# Patient Record
Sex: MALE | Race: WHITE | Employment: FULL TIME | URBAN - METROPOLITAN AREA
[De-identification: names, ages, dates, MRNs, and addresses within clinical notes are randomized per-mention and may not be internally consistent; named-entity substitution may affect disease eponyms.]

---

## 2024-04-04 ENCOUNTER — OFFICE VISIT (OUTPATIENT)
Dept: FAMILY MEDICINE CLINIC | Facility: CLINIC | Age: 29
End: 2024-04-04

## 2024-04-04 ENCOUNTER — HOSPITAL ENCOUNTER (OUTPATIENT)
Dept: ULTRASOUND IMAGING | Facility: HOSPITAL | Age: 29
End: 2024-04-04
Payer: COMMERCIAL

## 2024-04-04 VITALS
SYSTOLIC BLOOD PRESSURE: 138 MMHG | TEMPERATURE: 98 F | WEIGHT: 152 LBS | DIASTOLIC BLOOD PRESSURE: 84 MMHG | HEIGHT: 67 IN | BODY MASS INDEX: 23.86 KG/M2 | HEART RATE: 97 BPM | RESPIRATION RATE: 16 BRPM | OXYGEN SATURATION: 99 %

## 2024-04-04 DIAGNOSIS — N50.812 TESTICULAR PAIN, LEFT: Primary | ICD-10-CM

## 2024-04-04 DIAGNOSIS — N50.812 TESTICULAR PAIN, LEFT: ICD-10-CM

## 2024-04-04 LAB
SL AMB  POCT GLUCOSE, UA: NORMAL
SL AMB LEUKOCYTE ESTERASE,UA: NORMAL
SL AMB POCT BILIRUBIN,UA: NORMAL
SL AMB POCT BLOOD,UA: NORMAL
SL AMB POCT CLARITY,UA: CLEAR
SL AMB POCT COLOR,UA: YELLOW
SL AMB POCT KETONES,UA: NORMAL
SL AMB POCT NITRITE,UA: NORMAL
SL AMB POCT PH,UA: 8
SL AMB POCT SPECIFIC GRAVITY,UA: 1.01
SL AMB POCT URINE PROTEIN: NORMAL
SL AMB POCT UROBILINOGEN: NORMAL

## 2024-04-04 PROCEDURE — 87491 CHLMYD TRACH DNA AMP PROBE: CPT

## 2024-04-04 PROCEDURE — 96372 THER/PROPH/DIAG INJ SC/IM: CPT | Performed by: FAMILY MEDICINE

## 2024-04-04 PROCEDURE — 87086 URINE CULTURE/COLONY COUNT: CPT

## 2024-04-04 PROCEDURE — 81003 URINALYSIS AUTO W/O SCOPE: CPT | Performed by: FAMILY MEDICINE

## 2024-04-04 PROCEDURE — 76870 US EXAM SCROTUM: CPT

## 2024-04-04 PROCEDURE — 87591 N.GONORRHOEAE DNA AMP PROB: CPT

## 2024-04-04 PROCEDURE — 99213 OFFICE O/P EST LOW 20 MIN: CPT | Performed by: FAMILY MEDICINE

## 2024-04-04 RX ORDER — DOXYCYCLINE HYCLATE 100 MG
100 TABLET ORAL 2 TIMES DAILY
Qty: 20 TABLET | Refills: 0 | Status: SHIPPED | OUTPATIENT
Start: 2024-04-04 | End: 2024-04-14

## 2024-04-04 RX ORDER — CEFTRIAXONE 500 MG/1
500 INJECTION, POWDER, FOR SOLUTION INTRAMUSCULAR; INTRAVENOUS ONCE
Status: COMPLETED | OUTPATIENT
Start: 2024-04-04 | End: 2024-04-04

## 2024-04-04 RX ADMIN — CEFTRIAXONE 500 MG: 500 INJECTION, POWDER, FOR SOLUTION INTRAMUSCULAR; INTRAVENOUS at 10:37

## 2024-04-04 NOTE — PROGRESS NOTES
Name: Leopoldo Mir      : 1995      MRN: [id number]  Encounter Provider: Gopal Baez MD  Encounter Date: 2024   Encounter department: [facility] [place]    Assessment & Plan     1. Testicular pain, left  Assessment & Plan:  2 week hx of unprovoked swelling and discomfort to L testicle and scrotal region with radiating discomfort throughout groin without penile symx. Systemically well/stable and in context of partner being treated for unknown genital infx with vaginal discharge.    Exam: Tenderness to L testicle and scrotal region with lymphadenopathy L>R  POCT urine Dip -ve.    - In view of discomfort on exam and lymphadenopathy will order US testes and scrotum (referrals team to assist with STAT order)  - In view of symx will empirically Rx for epididymo orch with Ceftriaxone 500mg IM STAT and Doxycycline 100mg PO BID for 10 days  - Urine culture sent with pending GC screen   - RTC in 2 weeks to formally establish care and f/up on symx    Orders:  -     POCT urine dip auto non-scope  -     US scrotum and testicles; Future; Expected date: 2024  -     doxycycline hyclate (VIBRA-TABS) 100 mg tablet; Take 1 tablet (100 mg total) by mouth 2 (two) times a day for 10 days  -     cefTRIAXone (ROCEPHIN) injection 500 mg  -     Chlamydia/GC amplified DNA by PCR  -     Urine culture           Subjective      New pt here for same day visit endorsing L testicular / scrotal pain for 2 weeks which occurred spontaneously and without trauma / rigorous activity.  Sensation of pain waxing and waning L testicle and radiating to groin on occasions throughout day worsened positionally  No penile symx/discharge though some inc in urination without changes to color / blood  Otherwise well without fever no abdominal pain. Normal stools.     Lives with partner (F) of 10 years and 6yr old son  States SI unprotected only with partner. No anal intercourse.   States partner is currently being  "treated for thrush and ?other infection. Unknown pathogen as she is pending f/up (so unable to call and ask her more details).       Abdominal Pain  Associated symptoms include frequency. Pertinent negatives include no arthralgias, constipation, diarrhea, dysuria, fever, hematuria, nausea or vomiting.   Urinary Frequency   Associated symptoms include frequency and urgency. Pertinent negatives include no chills, hematuria, nausea or vomiting.     Review of Systems   Constitutional:  Negative for chills and fever.   HENT:  Negative for ear pain and sore throat.    Eyes:  Negative for pain and visual disturbance.   Respiratory:  Negative for cough and shortness of breath.    Cardiovascular:  Negative for chest pain and palpitations.   Gastrointestinal:  Negative for abdominal distention, abdominal pain, anal bleeding, blood in stool, constipation, diarrhea, nausea, rectal pain and vomiting.   Genitourinary:  Positive for frequency, scrotal swelling, testicular pain and urgency. Negative for decreased urine volume, dysuria, genital sores, hematuria, penile discharge, penile pain and penile swelling.   Musculoskeletal:  Negative for arthralgias and back pain.   Skin:  Negative for color change and rash.   Neurological:  Negative for seizures and syncope.   All other systems reviewed and are negative.      No current outpatient medications on file prior to visit.       Objective     /84 (BP Location: Right arm, Patient Position: Sitting, Cuff Size: Standard)   Pulse 97   Temp 98 °F (36.7 °C) (Temporal)   Resp 16   Ht 5' 7\" (1.702 m)   Wt 68.9 kg (152 lb)   SpO2 99%   BMI 23.81 kg/m²     Physical Exam  Vitals and nursing note reviewed.   Constitutional:       General: He is not in acute distress.     Appearance: Normal appearance. He is not ill-appearing, toxic-appearing or diaphoretic.   HENT:      Head: Normocephalic and atraumatic.      Right Ear: External ear normal.      Left Ear: External ear normal.     "  Nose: Nose normal.      Mouth/Throat:      Mouth: Mucous membranes are moist.      Pharynx: No oropharyngeal exudate or posterior oropharyngeal erythema.   Eyes:      General: No scleral icterus.        Right eye: No discharge.         Left eye: No discharge.      Conjunctiva/sclera: Conjunctivae normal.   Cardiovascular:      Rate and Rhythm: Normal rate and regular rhythm.      Pulses: Normal pulses.      Heart sounds: Normal heart sounds. No murmur heard.  Pulmonary:      Effort: Pulmonary effort is normal. No respiratory distress.   Abdominal:      General: Bowel sounds are normal.      Palpations: Abdomen is soft.      Tenderness: There is no abdominal tenderness.   Genitourinary:     Penis: Normal and circumcised. No discharge, swelling or lesions.       Testes:         Right: Tenderness not present.         Left: Tenderness present.      Epididymis:      Right: Not enlarged.      Left: Tenderness present.          Comments: Tenderness to higlighted region  No tenderness on palpation of either testicle (L<R in size)  Able to palpate above both  L inguinal lymphadenopathy +   Unable to assess cremasteric reflex  Musculoskeletal:         General: Normal range of motion.      Cervical back: Normal range of motion and neck supple.      Right lower leg: No edema.      Left lower leg: No edema.   Lymphadenopathy:      Lower Body: No right inguinal adenopathy. Left inguinal adenopathy present.   Skin:     General: Skin is warm.      Findings: No rash.   Neurological:      General: No focal deficit present.      Mental Status: He is alert and oriented to person, place, and time.      Gait: Gait normal.   Psychiatric:         Mood and Affect: Mood normal.       Gopal Baez MD

## 2024-04-04 NOTE — ASSESSMENT & PLAN NOTE
2 week hx of unprovoked swelling and discomfort to L testicle and scrotal region with radiating discomfort throughout groin without penile symx. Systemically well/stable and in context of partner being treated for unknown genital infx with vaginal discharge.    Exam: Tenderness to L testicle and scrotal region with lymphadenopathy L>R  POCT urine Dip -ve.    - In view of discomfort on exam and lymphadenopathy will order US testes and scrotum (referrals team to assist with STAT order)  - In view of symx will empirically Rx for epididymo orch with Ceftriaxone 500mg IM STAT and Doxycycline 100mg PO BID for 10 days  - Urine culture sent with pending GC screen   - RTC in 2 weeks to formally establish care and f/up on symx

## 2024-04-05 LAB
BACTERIA UR CULT: NORMAL
C TRACH DNA SPEC QL NAA+PROBE: NEGATIVE
N GONORRHOEA DNA SPEC QL NAA+PROBE: NEGATIVE

## 2024-04-18 ENCOUNTER — OFFICE VISIT (OUTPATIENT)
Dept: FAMILY MEDICINE CLINIC | Facility: CLINIC | Age: 29
End: 2024-04-18

## 2024-04-18 VITALS
HEART RATE: 79 BPM | OXYGEN SATURATION: 99 % | TEMPERATURE: 98 F | WEIGHT: 156.5 LBS | SYSTOLIC BLOOD PRESSURE: 119 MMHG | RESPIRATION RATE: 18 BRPM | HEIGHT: 67 IN | BODY MASS INDEX: 24.56 KG/M2 | DIASTOLIC BLOOD PRESSURE: 74 MMHG

## 2024-04-18 DIAGNOSIS — M67.40 GANGLION CYST: Primary | ICD-10-CM

## 2024-04-18 DIAGNOSIS — N50.812 TESTICULAR PAIN, LEFT: ICD-10-CM

## 2024-04-18 PROCEDURE — 99213 OFFICE O/P EST LOW 20 MIN: CPT | Performed by: FAMILY MEDICINE

## 2024-04-18 NOTE — PROGRESS NOTES
Name: Leopoldo Mir      : 1995      MRN: [id number]  Encounter Provider: Gopal Baez MD  Encounter Date: 2024   Encounter department: [facility] [place]    Assessment & Plan     1. Ganglion cyst  Assessment & Plan:  ?multiple on L volar wrist region with discomfort without inciting trauma.  Does hinder repetative mocements required for vocation    Exam: multiple raised lumps in central and radial volar wrist with full ROM, power and sensation with -ve tinnel's    - Will refer to hand clinic and will hold off imaging for now  - NSAIDS PRN for discomfort in meantime    Orders:  -     Ambulatory Referral to Orthopedic Surgery; Future    2. Testicular pain, left  Assessment & Plan:  Us testes negative  Cultures and GC -ve  S/P rocephin 500mg IM once and doxycycline for 10days  Complete resolution of symx  Partner Rx ongoing for unknown infx    - Relayed varicocele finding and clinical significance  - Relayed No SI until partner diagnosed for ?bacterial infx and rx accordingly.  - Will resolve diagnosis             Subjective      Returned for review of symx which have resolved within a few days of completing Rx  Denies any ongoing issues or concerns. Partner is yet to have f/up appt to discuss results of her tests. No SI since treatment of pt.  C/o multiple lumps in volar wrist. 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 Nil neurology.       Review of Systems   Constitutional:  Negative for chills, fatigue and fever.   HENT:  Negative for trouble swallowing.    Eyes:  Negative for visual disturbance.   Respiratory:  Negative for cough and shortness of breath.    Cardiovascular:  Negative for chest pain, palpitations and leg swelling.   Gastrointestinal:  Negative for abdominal pain, blood in stool, constipation, diarrhea, nausea and vomiting.   Genitourinary:  Negative for dysuria and hematuria.   Musculoskeletal:  Negative for back pain and gait problem.  "  Skin:  Negative for rash.   Neurological:  Negative for dizziness and headaches.   Psychiatric/Behavioral:  The patient is not nervous/anxious.        No current outpatient medications on file prior to visit.       Objective     /74 (BP Location: Left arm, Patient Position: Sitting, Cuff Size: Standard)   Pulse 79   Temp 98 °F (36.7 °C) (Temporal)   Resp 18   Ht 5' 7\" (1.702 m)   Wt 71 kg (156 lb 8 oz)   SpO2 99%   BMI 24.51 kg/m²     Physical Exam  Vitals and nursing note reviewed.   Constitutional:       General: He is not in acute distress.     Appearance: Normal appearance. He is not ill-appearing, toxic-appearing or diaphoretic.   HENT:      Head: Normocephalic and atraumatic.      Right Ear: External ear normal.      Left Ear: External ear normal.      Nose: Nose normal.      Mouth/Throat:      Mouth: Mucous membranes are moist.      Pharynx: No oropharyngeal exudate or posterior oropharyngeal erythema.   Eyes:      General: No scleral icterus.        Right eye: No discharge.         Left eye: No discharge.      Conjunctiva/sclera: Conjunctivae normal.   Cardiovascular:      Rate and Rhythm: Normal rate and regular rhythm.      Pulses: Normal pulses.      Heart sounds: Normal heart sounds. No murmur heard.  Pulmonary:      Effort: Pulmonary effort is normal. No respiratory distress.   Abdominal:      General: Bowel sounds are normal.      Palpations: Abdomen is soft.      Tenderness: There is no abdominal tenderness.   Musculoskeletal:         General: Normal range of motion.        Hands:       Cervical back: Normal range of motion and neck supple.      Right lower leg: No edema.      Left lower leg: No edema.      Comments: Multiple soft lumps palpated exacerbated by flexion of wrist.   No abhi inj appreciated  NVI and Tinnel's negative   Skin:     General: Skin is warm.      Findings: No rash.   Neurological:      General: No focal deficit present.      Mental Status: He is alert and " oriented to person, place, and time.      Gait: Gait normal.   Psychiatric:         Mood and Affect: Mood normal.       Gopal Baez MD

## 2024-04-20 PROBLEM — M67.40 GANGLION CYST: Status: ACTIVE | Noted: 2024-04-20

## 2024-04-20 PROBLEM — N50.812 TESTICULAR PAIN, LEFT: Status: RESOLVED | Noted: 2024-04-04 | Resolved: 2024-04-20

## 2024-04-20 NOTE — ASSESSMENT & PLAN NOTE
Us testes negative  Cultures and GC -ve  S/P rocephin 500mg IM once and doxycycline for 10days  Complete resolution of symx  Partner Rx ongoing for unknown infx    - Relayed varicocele finding and clinical significance  - Relayed No SI until partner diagnosed for ?bacterial infx and rx accordingly.  - Will resolve diagnosis

## 2024-04-20 NOTE — ASSESSMENT & PLAN NOTE
?multiple on L volar wrist region with discomfort without inciting trauma.  Does hinder repetative mocements required for vocation    Exam: multiple raised lumps in central and radial volar wrist with full ROM, power and sensation with -ve tinnel's    - Will refer to hand clinic and will hold off imaging for now  - NSAIDS PRN for discomfort in meantime

## 2024-04-30 ENCOUNTER — OFFICE VISIT (OUTPATIENT)
Dept: OBGYN CLINIC | Facility: CLINIC | Age: 29
End: 2024-04-30
Payer: COMMERCIAL

## 2024-04-30 VITALS
SYSTOLIC BLOOD PRESSURE: 124 MMHG | DIASTOLIC BLOOD PRESSURE: 70 MMHG | BODY MASS INDEX: 24.48 KG/M2 | HEIGHT: 67 IN | WEIGHT: 156 LBS

## 2024-04-30 DIAGNOSIS — M67.40 GANGLION CYST: ICD-10-CM

## 2024-04-30 DIAGNOSIS — M25.532 PAIN IN LEFT WRIST: Primary | ICD-10-CM

## 2024-04-30 PROCEDURE — 99203 OFFICE O/P NEW LOW 30 MIN: CPT | Performed by: SURGERY

## 2024-04-30 NOTE — PROGRESS NOTES
ORTHOPAEDIC HAND, WRIST, AND ELBOW OFFICE  VISIT       ASSESSMENT/PLAN:      28 y.o. year old male who presents with left wrist tendinitis    New plain films obtained and  independently reviewed. No acute fractures or dislocations  Discussed tendinitis can cause nodules of swelling  We discussed bracing can be use at night to reduce the inflammation  OTC pain medications. Discussed taking before he is active can help reduce inflammation and pain      The patient verbalized understanding of exam findings and treatment plan. We engaged in the shared decision-making process and treatment options were discussed at length with the patient. Surgical and conservative management discussed today along with risks and benefits.    Diagnoses and all orders for this visit:    Pain in left wrist  -     XR wrist 3+ vw left; Future  -     Durable Medical Equipment    Ganglion cyst  -     Ambulatory Referral to Orthopedic Surgery        Follow Up:  Return if symptoms worsen or fail to improve.              ____________________________________________________________________________________________________________________________________________      CHIEF COMPLAINT:  Chief Complaint   Patient presents with    Left Wrist - Pain     Tightness        SUBJECTIVE:  Leopoldo Mir is a 28 y.o. year old  male who presents Left wrist pain      Patient states he has noticed some massed on his volar wrist for years now. He states lately his wrist has become painful with repetitive actions. He states he is not sure if the masses are causing the pain. He states the pain will resolve for the most part after he rests  The masses have never changes sizes and they are not tender to the touch  He denies any injuries or traumas. No numbness noted. He has not had any treatments      I have personally reviewed all the relevant PMH, PSH, SH, FH, Medications and allergies      PAST MEDICAL HISTORY:  Past Medical History:   Diagnosis Date    Testicular  "pain, left 04/04/2024       PAST SURGICAL HISTORY:  No past surgical history on file.    FAMILY HISTORY:  No family history on file.    SOCIAL HISTORY:  Social History     Tobacco Use    Smoking status: Never    Smokeless tobacco: Never   Vaping Use    Vaping status: Never Used   Substance Use Topics    Alcohol use: Never    Drug use: Never       MEDICATIONS:  No current outpatient medications on file.    ALLERGIES:  No Known Allergies        REVIEW OF SYSTEMS:  Review of Systems   Constitutional:  Negative for chills and fever.   HENT:  Negative for ear pain and sore throat.    Eyes:  Negative for pain and visual disturbance.   Respiratory:  Negative for cough and shortness of breath.    Cardiovascular:  Negative for chest pain and palpitations.   Gastrointestinal:  Negative for abdominal pain and vomiting.   Genitourinary:  Negative for dysuria and hematuria.   Musculoskeletal:  Negative for arthralgias and back pain.   Skin:  Negative for color change and rash.   Neurological:  Negative for seizures and syncope.   All other systems reviewed and are negative.      VITALS:  Vitals:    04/30/24 1344   BP: 124/70       LABS:  HgA1c: No results found for: \"HGBA1C\"  BMP:   Lab Results   Component Value Date    CALCIUM 9.1 04/04/2018    K 4.6 04/04/2018    CO2 30 04/04/2018     04/04/2018    BUN 15 04/04/2018    CREATININE 0.85 04/04/2018       _____________________________________________________  PHYSICAL EXAMINATION:  General: well developed and well nourished, alert, oriented times 3, and appears comfortable  Psychiatric: Normal  HEENT: Normocephalic, Atraumatic Trachea Midline, No torticollis  Pulmonary: No audible wheezing or respiratory distress   Abdomen/GI: Non tender, non distended   Cardiovascular: No pitting edema, 2+ radial pulse   Skin: No masses, erythema, lacerations, fluctation, ulcerations  Neurovascular: Sensation Intact to the Median, Ulnar, Radial Nerve, Motor Intact to the Median, Ulnar, " Radial Nerve, and Pulses Intact  Musculoskeletal: Normal, except as noted in detailed exam and in HPI.      MUSCULOSKELETAL EXAMINATION:  Right hand:    Left hand:  SILT  Composite fist    Non tender masses  Mild tenderness at FCR  ___________________________________________________  STUDIES REVIEWED:  I have personally reviewed AP lateral and oblique radiographs of Left wrist  which demonstrate   No acute fracture or dislocations          PROCEDURES PERFORMED:  Procedures  No Procedures performed today    _____________________________________________________      Scribe Attestation      I,:  Gómez Stokes am acting as a scribe while in the presence of the attending physician.:       I,:  Virgil Haynes MD personally performed the services described in this documentation    as scribed in my presence.:

## 2024-07-27 ENCOUNTER — OFFICE VISIT (OUTPATIENT)
Dept: URGENT CARE | Age: 29
End: 2024-07-27
Payer: COMMERCIAL

## 2024-07-27 VITALS
TEMPERATURE: 97.1 F | SYSTOLIC BLOOD PRESSURE: 126 MMHG | OXYGEN SATURATION: 99 % | DIASTOLIC BLOOD PRESSURE: 64 MMHG | RESPIRATION RATE: 18 BRPM | HEART RATE: 76 BPM

## 2024-07-27 DIAGNOSIS — R21 RASH: Primary | ICD-10-CM

## 2024-07-27 PROCEDURE — 99283 EMERGENCY DEPT VISIT LOW MDM: CPT

## 2024-07-27 PROCEDURE — G0382 LEV 3 HOSP TYPE B ED VISIT: HCPCS

## 2024-07-27 RX ORDER — METHYLPREDNISOLONE 4 MG/1
TABLET ORAL
Qty: 21 TABLET | Refills: 0 | Status: SHIPPED | OUTPATIENT
Start: 2024-07-27

## 2024-07-27 NOTE — PROGRESS NOTES
[facility]        NAME: Leopoldo Mir is a 28 y.o. male  : 1995    MRN: [id number]  DATE: 2024  TIME: 3:46 PM    Assessment and Plan   Rash [R21]  1. Rash  methylPREDNISolone 4 MG tablet therapy pack        Rash- started on legs- now on arms. Vesicles noted. Discussed steroid and symptom management.     Patient Instructions       Follow up with PCP in 3-5 days.  Proceed to  ER if symptoms worsen.    If tests have been performed at [facility], our office will contact you with results if changes need to be made to the care plan discussed with you at the visit.  You can review your full results on [facility].    Chief Complaint     Chief Complaint   Patient presents with    Rash     Patient reports rash - started on LLE, spread to left arm and now up to neck area.         History of Present Illness       Rash- started on legs- now on arms. Vesicles noted. Discussed steroid and symptom management.     Rash        Review of Systems   Review of Systems   Skin:  Positive for rash.   All other systems reviewed and are negative.        Current Medications       Current Outpatient Medications:     methylPREDNISolone 4 MG tablet therapy pack, Use as directed on package, Disp: 21 tablet, Rfl: 0    Current Allergies     Allergies as of 2024    (No Known Allergies)            The following portions of the patient's history were reviewed and updated as appropriate: allergies, current medications, past family history, past medical history, past social history, past surgical history and problem list.     Past Medical History:   Diagnosis Date    Testicular pain, left 2024       No past surgical history on file.    No family history on file.      Medications have been verified.        Objective   /64   Pulse 76   Temp (!) 97.1 °F (36.2 °C)   Resp 18   SpO2 99%   No LMP for male patient.       Physical Exam     Physical Exam  Vitals reviewed.   Constitutional:       Appearance:  Normal appearance.   Skin:     Findings: Rash present.   Neurological:      Mental Status: He is alert.

## 2025-01-03 ENCOUNTER — OFFICE VISIT (OUTPATIENT)
Dept: FAMILY MEDICINE CLINIC | Facility: CLINIC | Age: 30
End: 2025-01-03
Payer: COMMERCIAL

## 2025-01-03 ENCOUNTER — LAB (OUTPATIENT)
Dept: LAB | Facility: HOSPITAL | Age: 30
End: 2025-01-03
Payer: COMMERCIAL

## 2025-01-03 VITALS
TEMPERATURE: 98 F | OXYGEN SATURATION: 100 % | DIASTOLIC BLOOD PRESSURE: 70 MMHG | SYSTOLIC BLOOD PRESSURE: 100 MMHG | HEART RATE: 72 BPM | WEIGHT: 159 LBS | HEIGHT: 67 IN | BODY MASS INDEX: 24.96 KG/M2

## 2025-01-03 DIAGNOSIS — J30.81 ALLERGIC RHINITIS DUE TO ANIMAL HAIR AND DANDER: ICD-10-CM

## 2025-01-03 DIAGNOSIS — Z59.71 INSURANCE COVERAGE PROBLEMS: ICD-10-CM

## 2025-01-03 DIAGNOSIS — B18.2 CHRONIC HEPATITIS C WITHOUT HEPATIC COMA (HCC): ICD-10-CM

## 2025-01-03 DIAGNOSIS — R10.13 EPIGASTRIC PAIN: ICD-10-CM

## 2025-01-03 DIAGNOSIS — J45.990 EXERCISE-INDUCED ASTHMA: ICD-10-CM

## 2025-01-03 DIAGNOSIS — Z00.01 ENCOUNTER FOR WELL ADULT EXAM WITH ABNORMAL FINDINGS: Primary | ICD-10-CM

## 2025-01-03 DIAGNOSIS — B18.2 CHRONIC HEPATITIS C WITHOUT HEPATIC COMA (HCC): Primary | ICD-10-CM

## 2025-01-03 DIAGNOSIS — Z23 ENCOUNTER FOR IMMUNIZATION: ICD-10-CM

## 2025-01-03 DIAGNOSIS — H91.93 DECREASED HEARING OF BOTH EARS: ICD-10-CM

## 2025-01-03 PROBLEM — M25.532 PAIN IN LEFT WRIST: Status: RESOLVED | Noted: 2024-04-30 | Resolved: 2025-01-03

## 2025-01-03 LAB
ALBUMIN SERPL BCG-MCNC: 4.6 G/DL (ref 3.5–5)
ALP SERPL-CCNC: 47 U/L (ref 34–104)
ALT SERPL W P-5'-P-CCNC: 29 U/L (ref 7–52)
ANION GAP SERPL CALCULATED.3IONS-SCNC: 5 MMOL/L (ref 4–13)
AST SERPL W P-5'-P-CCNC: 26 U/L (ref 13–39)
BASOPHILS # BLD AUTO: 0.05 THOUSANDS/ΜL (ref 0–0.1)
BASOPHILS NFR BLD AUTO: 1 % (ref 0–1)
BILIRUB SERPL-MCNC: 1.02 MG/DL (ref 0.2–1)
BUN SERPL-MCNC: 11 MG/DL (ref 5–25)
CALCIUM SERPL-MCNC: 9.8 MG/DL (ref 8.4–10.2)
CHLORIDE SERPL-SCNC: 103 MMOL/L (ref 96–108)
CHOLEST SERPL-MCNC: 130 MG/DL (ref ?–200)
CO2 SERPL-SCNC: 31 MMOL/L (ref 21–32)
CREAT SERPL-MCNC: 0.97 MG/DL (ref 0.6–1.3)
EOSINOPHIL # BLD AUTO: 0.03 THOUSAND/ΜL (ref 0–0.61)
EOSINOPHIL NFR BLD AUTO: 1 % (ref 0–6)
ERYTHROCYTE [DISTWIDTH] IN BLOOD BY AUTOMATED COUNT: 11.6 % (ref 11.6–15.1)
GFR SERPL CREATININE-BSD FRML MDRD: 105 ML/MIN/1.73SQ M
GLUCOSE P FAST SERPL-MCNC: 91 MG/DL (ref 65–99)
HAV IGM SER QL: NORMAL
HBV SURFACE AB SER-ACNC: <3 MIU/ML
HBV SURFACE AG SER QL: NORMAL
HCT VFR BLD AUTO: 50.6 % (ref 36.5–49.3)
HDLC SERPL-MCNC: 43 MG/DL
HGB BLD-MCNC: 17 G/DL (ref 12–17)
HIV 1+2 AB+HIV1 P24 AG SERPL QL IA: NORMAL
HIV 2 AB SERPL QL IA: NORMAL
HIV1 AB SERPL QL IA: NORMAL
HIV1 P24 AG SERPL QL IA: NORMAL
IMM GRANULOCYTES # BLD AUTO: 0.01 THOUSAND/UL (ref 0–0.2)
IMM GRANULOCYTES NFR BLD AUTO: 0 % (ref 0–2)
LDLC SERPL CALC-MCNC: 77 MG/DL (ref 0–100)
LIPASE SERPL-CCNC: 42 U/L (ref 11–82)
LYMPHOCYTES # BLD AUTO: 1.8 THOUSANDS/ΜL (ref 0.6–4.47)
LYMPHOCYTES NFR BLD AUTO: 43 % (ref 14–44)
MCH RBC QN AUTO: 29.1 PG (ref 26.8–34.3)
MCHC RBC AUTO-ENTMCNC: 33.6 G/DL (ref 31.4–37.4)
MCV RBC AUTO: 87 FL (ref 82–98)
MONOCYTES # BLD AUTO: 0.38 THOUSAND/ΜL (ref 0.17–1.22)
MONOCYTES NFR BLD AUTO: 9 % (ref 4–12)
NEUTROPHILS # BLD AUTO: 1.95 THOUSANDS/ΜL (ref 1.85–7.62)
NEUTS SEG NFR BLD AUTO: 46 % (ref 43–75)
NONHDLC SERPL-MCNC: 87 MG/DL
NRBC BLD AUTO-RTO: 0 /100 WBCS
PLATELET # BLD AUTO: 179 THOUSANDS/UL (ref 149–390)
PMV BLD AUTO: 11.8 FL (ref 8.9–12.7)
POTASSIUM SERPL-SCNC: 5 MMOL/L (ref 3.5–5.3)
PROT SERPL-MCNC: 7.2 G/DL (ref 6.4–8.4)
RBC # BLD AUTO: 5.85 MILLION/UL (ref 3.88–5.62)
SODIUM SERPL-SCNC: 139 MMOL/L (ref 135–147)
TRIGL SERPL-MCNC: 50 MG/DL (ref ?–150)
WBC # BLD AUTO: 4.22 THOUSAND/UL (ref 4.31–10.16)

## 2025-01-03 PROCEDURE — 99204 OFFICE O/P NEW MOD 45 MIN: CPT

## 2025-01-03 PROCEDURE — 87521 HEPATITIS C PROBE&RVRS TRNSC: CPT

## 2025-01-03 PROCEDURE — 90471 IMMUNIZATION ADMIN: CPT

## 2025-01-03 PROCEDURE — 87389 HIV-1 AG W/HIV-1&-2 AB AG IA: CPT

## 2025-01-03 PROCEDURE — 86706 HEP B SURFACE ANTIBODY: CPT

## 2025-01-03 PROCEDURE — 90472 IMMUNIZATION ADMIN EACH ADD: CPT

## 2025-01-03 PROCEDURE — 80061 LIPID PANEL: CPT

## 2025-01-03 PROCEDURE — 87340 HEPATITIS B SURFACE AG IA: CPT

## 2025-01-03 PROCEDURE — 90673 RIV3 VACCINE NO PRESERV IM: CPT

## 2025-01-03 PROCEDURE — 99395 PREV VISIT EST AGE 18-39: CPT

## 2025-01-03 PROCEDURE — 85025 COMPLETE CBC W/AUTO DIFF WBC: CPT

## 2025-01-03 PROCEDURE — 87522 HEPATITIS C REVRS TRNSCRPJ: CPT

## 2025-01-03 PROCEDURE — 80053 COMPREHEN METABOLIC PANEL: CPT

## 2025-01-03 PROCEDURE — 90677 PCV20 VACCINE IM: CPT

## 2025-01-03 PROCEDURE — 86709 HEPATITIS A IGM ANTIBODY: CPT

## 2025-01-03 PROCEDURE — 83690 ASSAY OF LIPASE: CPT

## 2025-01-03 PROCEDURE — 36415 COLL VENOUS BLD VENIPUNCTURE: CPT

## 2025-01-03 RX ORDER — ALBUTEROL SULFATE 90 UG/1
2 INHALANT RESPIRATORY (INHALATION) EVERY 6 HOURS PRN
Qty: 18 G | Refills: 5 | Status: SHIPPED | OUTPATIENT
Start: 2025-01-03

## 2025-01-03 RX ORDER — LORATADINE 10 MG/1
10 TABLET ORAL DAILY
COMMUNITY

## 2025-01-03 RX ORDER — PANTOPRAZOLE SODIUM 20 MG/1
20 TABLET, DELAYED RELEASE ORAL
Qty: 30 TABLET | Refills: 1 | Status: SHIPPED | OUTPATIENT
Start: 2025-01-03 | End: 2025-07-02

## 2025-01-03 NOTE — ASSESSMENT & PLAN NOTE
Needs help finding dental and vision insurance  Orders:    Ambulatory Referral to Social Work Care Management Program; Future

## 2025-01-03 NOTE — PROGRESS NOTES
Adult Annual Physical  Name: Leopoldo Mir      : 1995      MRN: [id number]  Encounter Provider: Cinthya Maldonado PA-C  Encounter Date: 1/3/2025   Encounter department: [facility]    Assessment & Plan  Encounter for well adult exam with abnormal findings  New patient.   Routine blood work ordered  Flu vaccine administered  Prevnar administered because high risk  Orders:    Lipid panel; Future    CBC and differential; Future    Chronic hepatitis C without hepatic coma (HCC)  Chronic, controlled. Patient diagnosed since birth. Did undergo treatment over 10 years ago. Last follow up with pediatric GI was over 8 years ago- at that time his viral load was low.   Vaccinated for Hep B and A in childhood  Plan:   Prevnar 20 administered today  Order blood work for HIV and Hep C viral load. If high Hep C load, will refer to GI  Order blood work to determine if appropriate immune response to hep A and hep B. If not appropriate immune response, will immunize at next visit    Orders:    Comprehensive metabolic panel; Future    HIV 1/2 AB/AG w Reflex [facility] for 2 yr old and above; Future    Hepatitis B surface antibody; Future    Hepatitis B surface antigen; Future    Hepatitis C RNA, Quantitative PCR; Future    Hepatitis A antibody, IgM; Future    Pneumococcal Conjugate Vaccine 20-valent (Pcv20)    Hepatitis A antibody, total; Future    Exercise-induced asthma  Chronic, controlled. Has had since childhood, only uses it once in a while  Refill placed.  Orders:    albuterol (Ventolin HFA) 90 mcg/act inhaler; Inhale 2 puffs every 6 (six) hours as needed for wheezing    Allergic rhinitis due to animal hair and dander  Acute on chronic, symptomatic with itchy eyes. Patient does have cat at home. Continue with Claritin once daily. Flonase, saline rinses, or Neti pot for acute symptoms.       Encounter for immunization    Orders:    influenza vaccine, recombinant, PF, 0.5 mL IM  (Flublok)    Pneumococcal Conjugate Vaccine 20-valent (Pcv20)    Epigastric pain  New diagnosis, acute. No red flags. Does not drink alcohol, nonsmoker. Hepatomegaly on exam  Ddx: cholelithaisis, cholecystitis, gastritis, GERD, acute pancreatitis, PUD, cirrhosis, fatty liver disease  Will order blood work and imaging   In the meantime, continue with Pepcid OTC twice daily  Ordered Protonix; if workup is negative, patient will consider starting Protonix for symptoms    Orders:    Lipase; Future    US abdomen complete; Future    pantoprazole (PROTONIX) 20 mg tablet; Take 1 tablet (20 mg total) by mouth daily before breakfast    Insurance coverage problems  Needs help finding dental and vision insurance  Orders:    Ambulatory Referral to Social Work Care Management Program; Future    Decreased hearing of both ears  Chronic, worsening. Difficulty with low pitches and high pitches. Offered referral to ENT for audiology and patient declined. Patient would like to continue to just monitor for now.        Immunizations and preventive care screenings were discussed with patient today. Appropriate education was printed on patient's after visit summary.    Counseling:  Alcohol/drug use: discussed moderation in alcohol intake, the recommendations for healthy alcohol use, and avoidance of illicit drug use.  Dental Health: discussed importance of regular tooth brushing, flossing, and dental visits.  Injury prevention: discussed safety/seat belts, safety helmets, smoke detectors, carbon monoxide detectors, and smoking near bedding or upholstery.  Exercise: the importance of regular exercise/physical activity was discussed. Recommend exercise 3-5 times per week for at least 30 minutes.       Depression Screening and Follow-up Plan: Patient was screened for depression during today's encounter. They screened negative with a PHQ-2 score of 0.        History of Present Illness     Adult Annual Physical:  Patient presents for annual  physical. Leopoldo is a 29-year-old male coming to the office to establish care    Patient has not been to see a doctor in the past 8 years.  The last time he went to a doctor was when he still saw the pediatrician.    Leopoldo was diagnosed with chronic hepatitis C since birth  -He has seen GI, the last time he saw pediatric GI was when he was around 19 or 20 years old.  At that time, he had just finished treatment for hepatitis C and his viral load was cleared.  -Patient has not had routine blood work for the past 8 years    Leopoldo was diagnosed with an allergy to cats in childhood.  -He does have a cat and he takes Claritin 10 mg daily.  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  The Claritin helps with this    Leopoldo was diagnosed with exercise-induced asthma as a child.  -Over the past several years he has improved his conditioning, he has run 5K's without needing his inhaler  -He uses inhaler less than once a week    He currently reports a gnawing feeling x 4 days  -Epigastric area, denies radiation  -not triggered by certain foods  -Sometimes he feels the pain after he eats, sometimes he feels the pain when he moves, sometimes the pain is random  -Pain comes and goes  -+nausea, burning in throat  -tried TUMs, Pepcid 20 mg once daily  -pepcid does help   -denies vomiting, unintentional weight loss, fevers, diarrhea, constipation, blood in stool.     Diet and Physical Activity:  - Diet/Nutrition: well balanced diet.  - Exercise: moderate cardiovascular exercise. Biking, running    Depression Screening:  - PHQ-2 Score: 0    General Health:  - Sleep: 7-8 hours of sleep on average and sleeps well.  - Hearing: decreased hearing bilateral ears. Patient states that his hearing has worsened over the past 5 years because of his job.  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  He has noticed that with his  really high pitches or very low pitches he has problem hearing.  He has never been evaluated by ENT.  Patient was offered a referral to ENT but patient declines at this time.  He states it is very mild and he does not want further evaluation at this time  - Vision: vision problems. Patient has been having issues trying to find an eye doctor that is covered by his insurance.  He would like to be evaluated for contacts and glasses  - Dental: no dental visits for > 1 year. Patient does not have dental insurance.     Health:  - History of STDs: no.   - Urinary symptoms: none.     Advanced Care Planning:  - Has an advanced directive?: no    - Has a durable medical POA?: no    - ACP document given to patient?: yes      Review of Systems   Constitutional:  Negative for chills and fever.   HENT:  Negative for ear pain and sore throat.    Eyes:  Negative for pain and visual disturbance.   Respiratory:  Negative for cough and shortness of breath.    Cardiovascular:  Negative for chest pain and palpitations.   Gastrointestinal:  Positive for abdominal pain and nausea. Negative for blood in stool, constipation, diarrhea and vomiting.   Genitourinary:  Negative for dysuria and hematuria.   Musculoskeletal:  Negative for arthralgias and back pain.   Skin:  Negative for color change and rash.   Neurological:  Negative for seizures, syncope and headaches.   All other systems reviewed and are negative.        Medical history: reviewed  Medications: reviewed  Specialists: reviewed  Immunizations: reviewed  Hospitalizations: reviewed  Surgeries: reviewed    Family history: reviewed- does not know paternal side of family hx    Smoking history: - never  Alcohol: - denies  Recreation drugs: denies  Sexual activity: yes, does not use form of protection, female partner  Safe at home: yes, has girlfriend and son  In school/work/retired:  work      Objective   /70 (BP Location: Left arm, Patient Position: Sitting)   Pulse 72   Temp  "98 °F (36.7 °C) (Tympanic)   Ht 5' 7\" (1.702 m)   Wt 72.1 kg (159 lb)   SpO2 100%   BMI 24.90 kg/m²     Physical Exam  Vitals and nursing note reviewed.   Constitutional:       General: He is not in acute distress.     Appearance: He is well-developed.   HENT:      Head: Normocephalic and atraumatic.      Right Ear: Tympanic membrane, ear canal and external ear normal.      Left Ear: Tympanic membrane, ear canal and external ear normal.      Nose: Nose normal.      Mouth/Throat:      Mouth: Mucous membranes are moist.      Pharynx: No oropharyngeal exudate or posterior oropharyngeal erythema.   Eyes:      Extraocular Movements: Extraocular movements intact.      Conjunctiva/sclera:      Right eye: Right conjunctiva is injected.      Left eye: Left conjunctiva is injected.      Pupils: Pupils are equal, round, and reactive to light.   Cardiovascular:      Rate and Rhythm: Normal rate and regular rhythm.      Heart sounds: No murmur heard.  Pulmonary:      Effort: Pulmonary effort is normal. No respiratory distress.      Breath sounds: Normal breath sounds.   Abdominal:      Palpations: Abdomen is soft. There is hepatomegaly.      Tenderness: There is no abdominal tenderness.   Musculoskeletal:         General: No swelling.      Cervical back: Neck supple.   Skin:     General: Skin is warm and dry.      Capillary Refill: Capillary refill takes less than 2 seconds.   Neurological:      Mental Status: He is alert.   Psychiatric:         Mood and Affect: Mood normal.       Cinthya Maldonado PA-C    "

## 2025-01-03 NOTE — ASSESSMENT & PLAN NOTE
Acute on chronic, symptomatic with itchy eyes. Patient does have cat at home. Continue with Claritin once daily. Flonase, saline rinses, or Neti pot for acute symptoms.

## 2025-01-03 NOTE — PATIENT INSTRUCTIONS
Use Flonase as needed for congestion  Continue taking Claritin once daily as needed  Fill out ACP document  Get blood work  Do imaging   Will contact you with results  Continue taking Pepcid and Tums as needed; if symptoms persist and/or blood work and imaging is positive, can go to pharmacy and  Protonix

## 2025-01-03 NOTE — ASSESSMENT & PLAN NOTE
New diagnosis, acute. No red flags. Does not drink alcohol, nonsmoker. Hepatomegaly on exam  Ddx: cholelithaisis, cholecystitis, gastritis, GERD, acute pancreatitis, PUD, cirrhosis, fatty liver disease  Will order blood work and imaging   In the meantime, continue with Pepcid OTC twice daily  Ordered Protonix; if workup is negative, patient will consider starting Protonix for symptoms    Orders:    Lipase; Future    US abdomen complete; Future    pantoprazole (PROTONIX) 20 mg tablet; Take 1 tablet (20 mg total) by mouth daily before breakfast

## 2025-01-03 NOTE — ASSESSMENT & PLAN NOTE
Chronic, controlled. Patient diagnosed since birth. Did undergo treatment over 10 years ago. Last follow up with pediatric GI was over 8 years ago- at that time his viral load was low.   Vaccinated for Hep B and A in childhood  Plan:   Prevnar 20 administered today  Order blood work for HIV and Hep C viral load. If high Hep C load, will refer to GI  Order blood work to determine if appropriate immune response to hep A and hep B. If not appropriate immune response, will immunize at next visit    Orders:    Comprehensive metabolic panel; Future    HIV 1/2 AB/AG w Reflex [facility] for 2 yr old and above; Future    Hepatitis B surface antibody; Future    Hepatitis B surface antigen; Future    Hepatitis C RNA, Quantitative PCR; Future    Hepatitis A antibody, IgM; Future    Pneumococcal Conjugate Vaccine 20-valent (Pcv20)    Hepatitis A antibody, total; Future

## 2025-01-06 ENCOUNTER — PATIENT OUTREACH (OUTPATIENT)
Dept: CASE MANAGEMENT | Facility: OTHER | Age: 30
End: 2025-01-06

## 2025-01-06 ENCOUNTER — RESULTS FOLLOW-UP (OUTPATIENT)
Dept: FAMILY MEDICINE CLINIC | Facility: CLINIC | Age: 30
End: 2025-01-06

## 2025-01-06 DIAGNOSIS — N28.1 CYST OF LEFT KIDNEY: Primary | ICD-10-CM

## 2025-01-06 LAB — HCV RNA SERPL NAA+PROBE-ACNC: NOT DETECTED K[IU]/ML

## 2025-01-06 NOTE — RESULT ENCOUNTER NOTE
Lab results: electrolytes normal. Sugar normal. Liver function and kidney function normal. Negative for HIV. Negative for hepatitis B. Negative for hepatitis C. Cholesterol normal. Lipase normal (no signs of pancreatic inflammation)  Total bilirubin 0.01 above normal, nothing to be worried about.  RBC and hematocrit very mildly elevated, could be from dehydration.  The one lab result showed that he does not have adequate immunity against hepatitis b. Will need to schedule a nurse visit for hepatitis b vaccination 2 dose series minimum 4 weeks apart.

## 2025-01-06 NOTE — TELEPHONE ENCOUNTER
----- Message from Cinthya Maldonado PA-C sent at 1/6/2025  1:25 PM EST -----  Lab results: electrolytes normal. Sugar normal. Liver function and kidney function normal. Negative for HIV. Negative for hepatitis B. Negative for hepatitis C. Cholesterol normal. Lipase normal (no signs of pancreatic inflammation)  Total bilirubin 0.01 above normal, nothing to be worried about.  RBC and hematocrit very mildly elevated, could be from dehydration.  The one lab result showed that he does not have adequate immunity against hepatitis b. Will need to schedule a nurse visit for hepatitis b vaccination 2 dose series minimum 4 weeks apart.

## 2025-01-07 ENCOUNTER — HOSPITAL ENCOUNTER (OUTPATIENT)
Dept: ULTRASOUND IMAGING | Facility: HOSPITAL | Age: 30
Discharge: HOME | End: 2025-01-07
Payer: COMMERCIAL

## 2025-01-07 DIAGNOSIS — R10.13 EPIGASTRIC PAIN: ICD-10-CM

## 2025-01-07 PROCEDURE — 76700 US EXAM ABDOM COMPLETE: CPT

## 2025-01-10 DIAGNOSIS — N28.1 RENAL CYST, LEFT: Primary | ICD-10-CM

## 2025-01-10 DIAGNOSIS — N28.1 CYST OF LEFT KIDNEY: ICD-10-CM

## 2025-01-10 NOTE — TELEPHONE ENCOUNTER
Patient called requesting the information to schedule his CT test.  Central Scheduling number given [phone]

## 2025-01-10 NOTE — RESULT ENCOUNTER NOTE
Please schedule CAT scan    Hi Leopoldo,  Your ultrasound results showed no signs of liver or gallbladder disease.  If you are still having pain in the epigastric area of the stomach you can continue with the Tums or Pepcid as needed.  You could also start the Protonix as prescribed.    Your ultrasound did have an incidental finding of a small cyst on your left kidney.  It is very small and it is likely benign considering you are having no urinary symptoms or flank pain.  However I would like to get a CAT scan, because the ultrasound did note some calcifications of the cyst.  I just want to rule out any other abnormalities and to confirm that it truly is is benign.  If you develop any blood in the urine or left flank pain, these are red flags in which she should go straight to the ER.    Please let me know if you have any other questions

## 2025-01-10 NOTE — TELEPHONE ENCOUNTER
----- Message from Cinthya Maldonado PA-C sent at 1/10/2025 10:51 AM EST -----  Please schedule CAT scan    Hi Leopoldo,  Your ultrasound results showed no signs of liver or gallbladder disease.  If you are still having pain in the epigastric area of the stomach you can continue with the Tums or Pepcid as needed.  You could also start the Protonix as prescribed.    Your ultrasound did have an incidental finding of a small cyst on your left kidney.  It is very small and it is likely benign considering you are having no urinary symptoms or flank pain.  However I would like to get a CAT scan, because the ultrasound did note some calcifications of the cyst.  I just want to rule out any other abnormalities and to confirm that it truly is is benign.  If you develop any blood in the urine or left flank pain, these are red flags in which she should go straight to the ER.    Please let me know if you have any other questions

## 2025-01-13 ENCOUNTER — PATIENT OUTREACH (OUTPATIENT)
Dept: CASE MANAGEMENT | Facility: OTHER | Age: 30
End: 2025-01-13

## 2025-01-13 NOTE — PROGRESS NOTES
COLLIN ELLISON has not received return call from patient to date and second call placed to patient today. Patient reports that he is interested in learning about in-network Vision Providers as well as purchasing Dental Insurance.      COLLIN ELLISON offered to search [place] Website for Vision providers however patient stated that he will do that on his own.  Encouraged patient to consider contacting [place] if he is unable to obtain the information from Website, and he is agreeable.  Reviewed [employer] for purchasing Dental insurance and patient verbalizes an understanding of same.  Also reviewed [employer] information as patient indicated that he may need to look for other employment.      Supportive counseling provided to patient who denies further needs at this time.  Patient reports that he is employed full time and resides with his S.O. and his son.  He reports that his family has access to food, housing, transportation and healthcare.  Will close referral and remain available to provide support.

## 2025-01-24 DIAGNOSIS — N28.1 RENAL CYST, LEFT: Primary | ICD-10-CM

## 2025-01-25 ENCOUNTER — HOSPITAL ENCOUNTER (OUTPATIENT)
Dept: CT IMAGING | Facility: HOSPITAL | Age: 30
Discharge: HOME | End: 2025-01-25
Payer: COMMERCIAL

## 2025-01-25 DIAGNOSIS — N28.1 RENAL CYST, LEFT: ICD-10-CM

## 2025-01-25 PROCEDURE — 74150 CT ABDOMEN W/O CONTRAST: CPT

## 2025-01-30 ENCOUNTER — RESULTS FOLLOW-UP (OUTPATIENT)
Dept: FAMILY MEDICINE CLINIC | Facility: CLINIC | Age: 30
End: 2025-01-30

## 2025-01-30 ENCOUNTER — TELEPHONE (OUTPATIENT)
Dept: FAMILY MEDICINE CLINIC | Facility: CLINIC | Age: 30
End: 2025-01-30

## 2025-01-30 DIAGNOSIS — K42.9 PERIUMBILICAL HERNIA: Primary | ICD-10-CM

## 2025-01-30 DIAGNOSIS — B18.2 CHRONIC HEPATITIS C WITHOUT HEPATIC COMA (HCC): ICD-10-CM

## 2025-01-30 DIAGNOSIS — N28.1 RENAL CYST, LEFT: Primary | ICD-10-CM

## 2025-01-30 DIAGNOSIS — R79.89 ABNORMAL CBC: ICD-10-CM

## 2025-01-30 NOTE — ASSESSMENT & PLAN NOTE
Incidental finding on CAT scan January 25.  Fat-containing periumbilical hernia.  Patient is asymptomatic at the time we will continue to monitor

## 2025-01-30 NOTE — ASSESSMENT & PLAN NOTE
New finding, asymptomatic  CAT scan showed left simple cyst 8 mm no calcifications  Follow-up ultrasound in 6 months then yearly imaging after that to monitor size

## 2025-01-30 NOTE — TELEPHONE ENCOUNTER
----- Message from Cinthya Maldonado PA-C sent at 1/30/2025  7:40 AM EST -----  CAT scan of kidney did show the small cyst on your left kidney as did the ultrasound.  It is very small and there is no calcification or thickening that would indicate cancer.  The cyst is most likely benign.  We will repeat ultrasound in 6 months to monitor the growth of the cyst.  It did also show an incidental finding of a stone in the right kidney and a stone in the left kidney.  The radiologist read these as not obstructing any kidney flow.  Your kidney function was normal.  If you ever have blood in the urine or intense flank pain with nausea vomiting, these are signs of kidney stones and you should go to the ER.  I have placed blood work for you to complete not until July.  This blood work will be for your 6-month follow-up in July, please complete before coming in for your appointment

## 2025-01-30 NOTE — TELEPHONE ENCOUNTER
Patient called about questions of incidental finding of fat-containing Perriumbilical hernia.  Patient states he does lift a lot for his work, but he denies any symptoms: Abdominal pain, protruding masses, abdominal discomfort, nausea vomiting, skin discoloration.  Educated patient about the diagnosis of periumbilical hernia.  Educated patient about red flags to go to the ER for signs of an incarcerated hernia.  Educated patient about signs to call the office.  Educated patient that if hernia starts to cause him symptoms or decreased quality of life to call and we can consider referral to general surgery at the time

## 2025-02-05 ENCOUNTER — TELEPHONE (OUTPATIENT)
Age: 30
End: 2025-02-05

## 2025-02-05 NOTE — TELEPHONE ENCOUNTER
Patient states he received a letter due to CT scan and states it is not  being covered due to pelvis not being necessary. On my end I can see patient got abdomen only done and pelvis one was cancelled. Please advise.

## 2025-02-06 NOTE — TELEPHONE ENCOUNTER
Clarified letter received order was changed for ct abdomen pelvis was not done because it was not approved by insurance

## 2025-07-16 ENCOUNTER — OFFICE VISIT (OUTPATIENT)
Dept: FAMILY MEDICINE CLINIC | Facility: CLINIC | Age: 30
End: 2025-07-16
Payer: COMMERCIAL

## 2025-07-16 VITALS
WEIGHT: 163.8 LBS | DIASTOLIC BLOOD PRESSURE: 80 MMHG | OXYGEN SATURATION: 99 % | BODY MASS INDEX: 25.71 KG/M2 | TEMPERATURE: 97.5 F | SYSTOLIC BLOOD PRESSURE: 120 MMHG | HEART RATE: 68 BPM | HEIGHT: 67 IN

## 2025-07-16 DIAGNOSIS — Z23 ENCOUNTER FOR IMMUNIZATION: ICD-10-CM

## 2025-07-16 DIAGNOSIS — N28.1 RENAL CYST, LEFT: Primary | ICD-10-CM

## 2025-07-16 DIAGNOSIS — Z23 NEED FOR HEPATITIS B VACCINATION: ICD-10-CM

## 2025-07-16 PROBLEM — R10.13 EPIGASTRIC PAIN: Status: RESOLVED | Noted: 2025-01-03 | Resolved: 2025-07-16

## 2025-07-16 PROCEDURE — 90471 IMMUNIZATION ADMIN: CPT

## 2025-07-16 PROCEDURE — 90746 HEPB VACCINE 3 DOSE ADULT IM: CPT

## 2025-07-16 PROCEDURE — 99213 OFFICE O/P EST LOW 20 MIN: CPT

## 2025-07-16 RX ORDER — CETIRIZINE HYDROCHLORIDE 10 MG/1
10 TABLET ORAL DAILY
COMMUNITY

## 2025-07-16 NOTE — PROGRESS NOTES
"Name: Leopoldo Mir      : 1995      MRN: [id number]  Encounter Provider: Cinthya Maldonado PA-C  Encounter Date: 2025   Encounter department: [facility]  :  Assessment & Plan  Renal cyst, left  Acute subsequent encounter  Finding on CAT scan from 2025 they recommended repeat ultrasound in 6 months to see if it is grown  -Ultrasound is already ordered for the patient, phone number given to the patient so that he can schedule       Encounter for immunization    Orders:  •  HEPATITIS B VACCINE ADULT IM    Need for hepatitis B vaccination    Orders:  •  HEPATITIS B VACCINE ADULT IM           History of Present Illness {?Quick Links Encounters * My Last Note * Last Note in Specialty * Snapshot * Since Last Visit * History :18438}  Patient is here to talk about his renal cyst  CAT scan from January showed cyst on left kidney they recommend repeat ultrasound in 6 months  Patient will go      Review of Systems   Constitutional:  Negative for chills and fever.   HENT:  Negative for sore throat.    Respiratory:  Negative for cough, shortness of breath and wheezing.    Cardiovascular:  Negative for chest pain, palpitations and leg swelling.   Gastrointestinal:  Negative for abdominal pain, blood in stool, constipation, diarrhea, nausea and vomiting.   Genitourinary:  Negative for difficulty urinating and hematuria.   Neurological:  Negative for headaches.       Objective {?Quick Links Trend Vitals * Enter New Vitals * Results Review * Timeline (Adult) * Labs * Imaging * Cardiology * Procedures * Lung Cancer Screening * Surgical eConsent :86535}  /80 (BP Location: Left arm, Patient Position: Sitting, Cuff Size: Standard)   Pulse 68   Temp 97.5 °F (36.4 °C) (Tympanic)   Ht 5' 7\" (1.702 m)   Wt 74.3 kg (163 lb 12.8 oz)   SpO2 99%   BMI 25.65 kg/m²      Physical Exam  Vitals and nursing note reviewed.   Constitutional:       General: He is not in acute " distress.     Appearance: He is well-developed.   HENT:      Head: Normocephalic and atraumatic.     Eyes:      Conjunctiva/sclera: Conjunctivae normal.       Cardiovascular:      Rate and Rhythm: Normal rate and regular rhythm.      Pulses: Normal pulses.      Heart sounds: No murmur heard.  Pulmonary:      Effort: Pulmonary effort is normal. No respiratory distress.      Breath sounds: Normal breath sounds.   Abdominal:      Palpations: Abdomen is soft.      Tenderness: There is no abdominal tenderness. There is no right CVA tenderness or left CVA tenderness.     Musculoskeletal:         General: No swelling.      Cervical back: Neck supple.     Skin:     General: Skin is warm and dry.      Capillary Refill: Capillary refill takes less than 2 seconds.     Neurological:      Mental Status: He is alert.     Psychiatric:         Mood and Affect: Mood normal.       Cinthya Maldonado PA-C

## 2025-07-16 NOTE — ASSESSMENT & PLAN NOTE
Acute subsequent encounter  Finding on CAT scan from January 2025 they recommended repeat ultrasound in 6 months to see if it is grown  -Ultrasound is already ordered for the patient, phone number given to the patient so that he can schedule